# Patient Record
Sex: FEMALE | Race: WHITE | NOT HISPANIC OR LATINO | Employment: FULL TIME | URBAN - METROPOLITAN AREA
[De-identification: names, ages, dates, MRNs, and addresses within clinical notes are randomized per-mention and may not be internally consistent; named-entity substitution may affect disease eponyms.]

---

## 2023-12-07 ENCOUNTER — TELEPHONE (OUTPATIENT)
Dept: OBSTETRICS AND GYNECOLOGY | Facility: CLINIC | Age: 23
End: 2023-12-07

## 2023-12-07 ENCOUNTER — OFFICE VISIT (OUTPATIENT)
Dept: OBSTETRICS AND GYNECOLOGY | Facility: CLINIC | Age: 23
End: 2023-12-07
Payer: COMMERCIAL

## 2023-12-07 VITALS
HEIGHT: 65 IN | DIASTOLIC BLOOD PRESSURE: 86 MMHG | SYSTOLIC BLOOD PRESSURE: 138 MMHG | WEIGHT: 233.6 LBS | BODY MASS INDEX: 38.92 KG/M2

## 2023-12-07 DIAGNOSIS — Z30.46 ENCOUNTER FOR NEXPLANON REMOVAL: ICD-10-CM

## 2023-12-07 DIAGNOSIS — Z01.419 ROUTINE GYNECOLOGICAL EXAMINATION: Primary | ICD-10-CM

## 2023-12-07 LAB
B-HCG UR QL: NEGATIVE
BILIRUB BLD-MCNC: NEGATIVE MG/DL
CLARITY, POC: CLEAR
COLOR UR: YELLOW
EXPIRATION DATE: NORMAL
GLUCOSE UR STRIP-MCNC: NEGATIVE MG/DL
INTERNAL NEGATIVE CONTROL: NORMAL
INTERNAL POSITIVE CONTROL: NORMAL
KETONES UR QL: NEGATIVE
LEUKOCYTE EST, POC: NEGATIVE
Lab: NORMAL
NITRITE UR-MCNC: NEGATIVE MG/ML
PH UR: 5 [PH] (ref 5–8)
PROT UR STRIP-MCNC: NEGATIVE MG/DL
RBC # UR STRIP: NEGATIVE /UL
SP GR UR: 1 (ref 1–1.03)
UROBILINOGEN UR QL: NORMAL

## 2023-12-07 RX ORDER — DESVENLAFAXINE SUCCINATE 50 MG/1
50 TABLET, EXTENDED RELEASE ORAL DAILY
COMMUNITY
Start: 2023-09-25 | End: 2024-09-24

## 2023-12-07 NOTE — PROGRESS NOTES
24 yo  here for Nexplanon removal. Menarche 15yo regular cycles, up to eight days. Sexually active, feels loved. No hx of abuse. No family hx of Colon, Breast, uterine or ovarian cancer.      PMH: No past medical history on file.             PSH:     Past Surgical History:   Procedure Laterality Date    WISDOM TOOTH EXTRACTION              POB hx: G0  PGYN hx:  STD Denies ( GC/CT today)   Pap Next appt; First screening   Contraception; Barrier; Desires ParaGard    Menarche 15yo   Menopause N/A       Social hx:   Social History     Socioeconomic History    Marital status: Single   Tobacco Use    Smoking status: Never    Smokeless tobacco: Never   Vaping Use    Vaping Use: Never used   Substance and Sexual Activity    Alcohol use: Never    Drug use: Never    Sexual activity: Defer        [  X ] no h/o abuse/DV:                 Allergies:       Allergies   Allergen Reactions    Azithromycin Rash    Cefdinir Rash                  Meds:   Current Outpatient Medications:     desvenlafaxine (PRISTIQ) 50 MG 24 hr tablet, Take 1 tablet by mouth Daily., Disp: , Rfl:     escitalopram (LEXAPRO) 10 MG tablet, Take 1 tablet by mouth Daily., Disp: 30 tablet, Rfl: 5    ondansetron ODT (ZOFRAN-ODT) 4 MG disintegrating tablet, Take 1 sublingually every 4-6 hours as needed for nausea vomiting., Disp: 20 tablet, Rfl: 0    vitamin D (ERGOCALCIFEROL) 1.25 MG (02106 UT) capsule capsule, Take 1 capsule by mouth Every 7 (Seven) Days.  , Disp: , Rfl:      Review of Systems   Spotting at times     Vitals:    23 0948   BP: 138/86        OBGyn Exam     Vitals: VSS; AF    General Appearance:  Awake. Alert. Well developed. Well nourished. In no acute distress.    Visual Inspection: ° Abdomen was normal on visual inspection.  Palpation: ° Abdomen was soft. ° Abdominal non-tender.    ° Oriented to time, place, and person.  Skin:  ° General appearance was normal. No bruising or ecchymosis.      PROCEDURE NOTE:  Patient was counseled on  risks of Nexplanon removal, including pain, infection, bleeding, discomfort, bruising, and reaction to local anesthesia. Written consent was obtained.  A time out was performed using two patient identifiers.  The patient was placed in the supine position with her left upper extremity supinated and abducted away from the body to expose the Nexplanon device. The device was easily palpated underneath the skin in the biceps groove.  The area was cleansed with alcohol and approximately 5mL of 1% lidocaine with epinephrine were injected at the prior insertion site for anesthesia. The area was then prepped with betadine. A 0.25cm incision was made over the previous insertion scar and the Nexplanon device was tented up through the incision, grasped with a  hemostat clamp and easily removed from the arm.  The device was inspected and noted to be intact.  The incision was closed with a steri strip and a pressure dressing was applied. The patient tolerated the procedure well.     Diagnoses and all orders for this visit:    1. Routine gynecological examination (Primary)  -     POC Urinalysis Dipstick  -     POC Pregnancy, Urine    2. Encounter for Nexplanon removal     Nexplanon removed   GC/CT sent  Pap with IUD placement; come on your cycle.   Barrier method to be used     Christopher Wolf, DO  12/07/2023    10:22 EST

## 2023-12-09 LAB
C TRACH RRNA SPEC QL NAA+PROBE: POSITIVE
N GONORRHOEA RRNA SPEC QL NAA+PROBE: NEGATIVE
T VAGINALIS RRNA SPEC QL NAA+PROBE: NEGATIVE

## 2023-12-11 RX ORDER — DOXYCYCLINE HYCLATE 100 MG
100 TABLET ORAL 2 TIMES DAILY
Qty: 14 TABLET | Refills: 0 | Status: SHIPPED | OUTPATIENT
Start: 2023-12-11 | End: 2023-12-18

## 2023-12-11 NOTE — PROGRESS NOTES
Chlamydia infection. Will order Antibiotics. You and your partner must take ( He can go to his Doctor or the health Department) No intercourse for 14 days after finishing Abx. Test of cure 1 month with me

## 2024-05-02 ENCOUNTER — TELEPHONE (OUTPATIENT)
Dept: URGENT CARE | Facility: CLINIC | Age: 24
End: 2024-05-02
Payer: COMMERCIAL

## 2024-05-02 DIAGNOSIS — N76.0 BACTERIAL VAGINOSIS: Primary | ICD-10-CM

## 2024-05-02 DIAGNOSIS — B96.89 BACTERIAL VAGINOSIS: Primary | ICD-10-CM

## 2024-05-02 RX ORDER — METRONIDAZOLE 500 MG/1
500 TABLET ORAL 2 TIMES DAILY
Qty: 14 TABLET | Refills: 0 | Status: SHIPPED | OUTPATIENT
Start: 2024-05-02 | End: 2024-05-09

## 2024-05-02 NOTE — TELEPHONE ENCOUNTER
Called, patient answered and confirmed her date of birth, relayed test results as detailed below:     NuSwab VG+ - Swab, Vagina  Order: [id number]  Status: Final result       Visible to patient: Yes (seen)       Dx: Dysuria; Unprotected sex; Foul smelli...    Specimen Information: Vagina; Swab   0 Result Notes       1 HM Topic      Component  Ref Range & Units    Atopobium Vaginae  Score High - 2 Abnormal    BVAB 2  Score High - 2 Abnormal    Megasphaera 1  Score High - 2 Abnormal    Comment: Calculate total score by adding the 3 individual bacterial  vaginosis (BV) marker scores together.  Total score is  interpreted as follows:  Total score 0-1: Indicates the absence of BV.  Total score   2: Indeterminate for BV. Additional clinical                   data should be evaluated to establish a                   diagnosis.  Total score 3-6: Indicates the presence of BV.  This test was developed and its performance characteristics  determined by [facility].  It has not been cleared or approved  by the Food and Drug Administration.   Candida Albicans, TJ  Negative Negative   Candida Glabrata, TJ  Negative Negative   Trichomonas vaginosis  Negative Negative   Chlamydia trachomatis, TJ  Negative Negative   Neisseria gonorrhoeae, TJ  Negative Negative   Resulting Agency [facility]              Narrative  Performed by: [facility]  Test(s) 180056-Candida albicans, TJ; 180057-Candida glabrata, TJ  was developed and its performance characteristics determined  by [facility]. It has not been cleared or approved by the Food  and Drug Administration.  Performed at:  [facility]  [id number]  : Kylie Tracy MD, Phone:  [phone]      Specimen Collected: 04/30/24 09:33 EDT Last Resulted: 05/02/24 03:35 EDT           Recommended Metronidazole 500 mg BID x7 days, no alcohol while taking medication. Patient reports no further questions at this time, recommended she call us back if she  has any, patient expressed understanding and agreement to all of the above.     -John Cooksey, PA-C

## 2024-10-10 ENCOUNTER — OFFICE VISIT (OUTPATIENT)
Dept: OBSTETRICS AND GYNECOLOGY | Facility: CLINIC | Age: 24
End: 2024-10-10
Payer: COMMERCIAL

## 2024-10-10 VITALS
WEIGHT: 218.6 LBS | DIASTOLIC BLOOD PRESSURE: 78 MMHG | HEIGHT: 65 IN | SYSTOLIC BLOOD PRESSURE: 118 MMHG | BODY MASS INDEX: 36.42 KG/M2

## 2024-10-10 DIAGNOSIS — Z01.419 CERVICAL SMEAR, AS PART OF ROUTINE GYNECOLOGICAL EXAMINATION: ICD-10-CM

## 2024-10-10 DIAGNOSIS — Z01.419 ROUTINE GYNECOLOGICAL EXAMINATION: Primary | ICD-10-CM

## 2024-10-10 LAB

## 2024-10-10 RX ORDER — ALBUTEROL SULFATE 90 UG/1
2 INHALANT RESPIRATORY (INHALATION)
COMMUNITY
Start: 2024-09-10 | End: 2024-10-10

## 2024-10-10 RX ORDER — HYDROXYZINE HYDROCHLORIDE 25 MG/1
25 TABLET, FILM COATED ORAL
COMMUNITY
Start: 2024-09-10

## 2024-10-10 RX ORDER — DESVENLAFAXINE 50 MG/1
50 TABLET, FILM COATED, EXTENDED RELEASE ORAL DAILY
COMMUNITY
Start: 2024-09-10 | End: 2025-09-10

## 2024-10-10 RX ORDER — LEVOTHYROXINE SODIUM 50 UG/1
50 TABLET ORAL DAILY
COMMUNITY
Start: 2024-09-13 | End: 2025-09-13

## 2024-10-10 NOTE — PROGRESS NOTES
"GYN Annual Exam     CC- Here for annual exam.     Hayley Morgan is a 24 y.o. female new patient who presents for annual well woman exam. Periods are regular every 28-30 days, lasting 6 days.     OB History    No obstetric history on file.         Menarche: 16 yo   Current contraception: condoms  History of abnormal Pap smear: no  History of abnormal mammogram: no  Family history of uterine, colon or ovarian cancer: no  Family history of breast cancer: no  H/o STDs: CT 2024   Last pap:Today is her first   Gardasil:completed  MAYRA: none    Health Maintenance   Topic Date Due    Annual Gynecologic Pelvic and Breast Exam  Never done    BMI FOLLOWUP  Never done    HPV VACCINES (1 - 3-dose series) Never done    TDAP/TD VACCINES (2 - Td or Tdap) 08/03/2022    ANNUAL PHYSICAL  Never done    PAP SMEAR  Never done    COVID-19 Vaccine (3 - 2023-24 season) 09/01/2024    HEPATITIS C SCREENING  Completed    INFLUENZA VACCINE  Completed    MENINGOCOCCAL VACCINE  Completed    Pneumococcal Vaccine 0-64  Aged Out    CHLAMYDIA SCREENING  Discontinued       No past medical history on file.    Past Surgical History:   Procedure Laterality Date    WISDOM TOOTH EXTRACTION           Current Outpatient Medications:     desvenlafaxine (PRISTIQ) 50 MG 24 hr tablet, Take 1 tablet by mouth Daily., Disp: , Rfl:     hydrOXYzine (ATARAX) 25 MG tablet, Take 1 tablet by mouth., Disp: , Rfl:     levothyroxine (SYNTHROID, LEVOTHROID) 50 MCG tablet, Take 1 tablet by mouth Daily., Disp: , Rfl:     Allergies   Allergen Reactions    Azithromycin Rash    Cefdinir Rash       Social History     Tobacco Use    Smoking status: Never    Smokeless tobacco: Never   Vaping Use    Vaping status: Never Used   Substance Use Topics    Alcohol use: Never    Drug use: Never       Family History   Problem Relation Age of Onset    No Known Problems Mother     No Known Problems Father        Review of Systems  Neg     /78   Ht 165.1 cm (65\")   Wt 99.2 kg (218 lb 9.6 " oz)   LMP 10/01/2024 (Approximate)   BMI 36.38 kg/m²     Physical Exam  Exam conducted with a chaperone present.   Constitutional:       Appearance: Normal appearance. She is obese.   Pulmonary:      Effort: Pulmonary effort is normal.      Breath sounds: Normal breath sounds.   Abdominal:      General: Abdomen is flat.      Palpations: Abdomen is soft.   Genitourinary:     General: Normal vulva.      Exam position: Lithotomy position.      Vagina: Normal.      Cervix: Normal.      Uterus: Normal.       Adnexa: Right adnexa normal and left adnexa normal.   Neurological:      General: No focal deficit present.      Mental Status: She is alert and oriented to person, place, and time.   Psychiatric:         Mood and Affect: Mood normal.         Behavior: Behavior normal.              Assessment/Plan    1) GYN HM: pap/G/C/T  SBE demonstrated and encouraged.  2) STD screening: declines Condoms encouraged.  3) Contraception: condoms Nexplanon ordered today   4) Family Planning: family planning: no plans at present, encourage folic acid daily  5) Diet and Exercise discussed  6) Smoking Status: No  7) Social:   8) MMG- plan age 42  9)Follow up 2 weeks nexplanon        Diagnoses and all orders for this visit:    1. Routine gynecological examination (Primary)  -     POC Urinalysis Dipstick  -     POC Pregnancy, Urine  -     Chlamydia trachomatis, Neisseria gonorrhoeae, Trichomonas vaginalis, PCR - Urine, Urine, Random Void  -     IGP,CtNgTv,rfx Aptima HPV ASCU    2. Cervical smear, as part of routine gynecological examination  -     IGP,CtNgTv,rfx Aptima HPV ASCU          Christopher Wolf DO  10/10/2024    14:49 EDT

## 2024-10-13 LAB
C TRACH RRNA SPEC QL NAA+PROBE: NEGATIVE
N GONORRHOEA RRNA SPEC QL NAA+PROBE: NEGATIVE
T VAGINALIS RRNA SPEC QL NAA+PROBE: NEGATIVE

## 2024-10-15 ENCOUNTER — TELEPHONE (OUTPATIENT)
Dept: OBSTETRICS AND GYNECOLOGY | Facility: CLINIC | Age: 24
End: 2024-10-15
Payer: COMMERCIAL

## 2024-10-17 LAB
C TRACH RRNA CVX QL NAA+PROBE: NEGATIVE
CONV .: NORMAL
CYTOLOGIST CVX/VAG CYTO: NORMAL
CYTOLOGY CVX/VAG DOC CYTO: NORMAL
CYTOLOGY CVX/VAG DOC THIN PREP: NORMAL
DX ICD CODE: NORMAL
Lab: NORMAL
Lab: NORMAL
N GONORRHOEA RRNA CVX QL NAA+PROBE: NEGATIVE
OTHER STN SPEC: NORMAL
STAT OF ADQ CVX/VAG CYTO-IMP: NORMAL
T VAGINALIS RRNA SPEC QL NAA+PROBE: NEGATIVE

## 2024-10-21 ENCOUNTER — TELEPHONE (OUTPATIENT)
Dept: OBSTETRICS AND GYNECOLOGY | Facility: CLINIC | Age: 24
End: 2024-10-21

## 2024-10-21 NOTE — TELEPHONE ENCOUNTER
Nexplanon covered as buy and bill, device is available. I called the patient and left a voice mail to call and schedule.

## 2024-10-21 NOTE — TELEPHONE ENCOUNTER
Hub staff attempted to follow warm transfer process and was unsuccessful     Caller: Hayley Morgan    Relationship to patient: Self    Best call back number: [phone] (home)       Patient is needing: PT CALLING TO SCHEDULE NEXPLANON INSERTION. OFFICE CLOSED.

## 2024-11-05 ENCOUNTER — PROCEDURE VISIT (OUTPATIENT)
Dept: OBSTETRICS AND GYNECOLOGY | Facility: CLINIC | Age: 24
End: 2024-11-05
Payer: COMMERCIAL

## 2024-11-05 VITALS
HEIGHT: 65 IN | DIASTOLIC BLOOD PRESSURE: 90 MMHG | SYSTOLIC BLOOD PRESSURE: 124 MMHG | WEIGHT: 211 LBS | BODY MASS INDEX: 35.16 KG/M2

## 2024-11-05 DIAGNOSIS — Z30.017 NEXPLANON INSERTION: ICD-10-CM

## 2024-11-05 DIAGNOSIS — Z13.89 SCREENING FOR GENITOURINARY CONDITION: Primary | ICD-10-CM

## 2024-11-05 NOTE — PROGRESS NOTES
Last exam: here today for Nexpolanon placement   LMP last week  Not sexually active > 2 weeks   HcG Neg     Hayley Morgan is a 24 y.o. female new patient who presents for annual well woman exam. Periods are regular every 28-30 days, lasting 6 days.     OB History    No obstetric history on file.         Menarche: 14 yo   Current contraception: condoms  History of abnormal Pap smear: no  History of abnormal mammogram: no  Family history of uterine, colon or ovarian cancer: no  Family history of breast cancer: Yes GM sister   H/o STDs: CT 2024   Last pap:Today is her first   Gardasil:completed  MAYRA: none    Health Maintenance   Topic Date Due    Annual Gynecologic Pelvic and Breast Exam  Never done    BMI FOLLOWUP  Never done    HPV VACCINES (1 - 3-dose series) Never done    TDAP/TD VACCINES (2 - Td or Tdap) 08/03/2022    ANNUAL PHYSICAL  Never done    COVID-19 Vaccine (3 - 2024-25 season) 09/01/2024    PAP SMEAR  10/10/2027    HEPATITIS C SCREENING  Completed    INFLUENZA VACCINE  Completed    MENINGOCOCCAL VACCINE  Completed    Pneumococcal Vaccine 0-64  Aged Out    CHLAMYDIA SCREENING  Discontinued       No past medical history on file.    Past Surgical History:   Procedure Laterality Date    WISDOM TOOTH EXTRACTION           Current Outpatient Medications:     desvenlafaxine (PRISTIQ) 50 MG 24 hr tablet, Take 1 tablet by mouth Daily., Disp: , Rfl:     Etonogestrel (Nexplanon) 68 MG implant subdermal implant, To be inserted one time by prescriber. Route Subdermal., Disp: 1 each, Rfl: 0    hydrOXYzine (ATARAX) 25 MG tablet, Take 1 tablet by mouth., Disp: , Rfl:     levothyroxine (SYNTHROID, LEVOTHROID) 50 MCG tablet, Take 1 tablet by mouth Daily., Disp: , Rfl:     Current Facility-Administered Medications:     Etonogestrel (NEXPLANON) 68 MG subdermal implant, , Intradermal, Once, Christopher Wolf, DO    Allergies   Allergen Reactions    Azithromycin Rash    Cefdinir Rash       Social History     Tobacco Use     "Smoking status: Never    Smokeless tobacco: Never   Vaping Use    Vaping status: Never Used   Substance Use Topics    Alcohol use: Never    Drug use: Never       Family History   Problem Relation Age of Onset    No Known Problems Mother     No Known Problems Father        Review of Systems  Neg     /90   Ht 165.1 cm (65\")   Wt 95.7 kg (211 lb)   LMP 10/01/2024 (Approximate)   BMI 35.11 kg/m²     Physical Exam  Exam conducted with a chaperone present.   Constitutional:       Appearance: Normal appearance. She is obese.   Pulmonary:      Effort: Pulmonary effort is normal.      Breath sounds: Normal breath sounds.   Abdominal:      General: Abdomen is flat.      Palpations: Abdomen is soft.   Genitourinary:     General: Normal vulva.      Exam position: Lithotomy position.      Vagina: Normal.      Cervix: Normal.      Uterus: Normal.       Adnexa: Right adnexa normal and left adnexa normal.   Neurological:      General: No focal deficit present.      Mental Status: She is alert and oriented to person, place, and time.   Psychiatric:         Mood and Affect: Mood normal.         Behavior: Behavior normal.       Nexplanon Insertion:    Procedures      Pre Dx: 1) Nexplanon Insertion   Post Dx: 1) Nexplanon Insertion  Urine pregnancy test was negative.  Patient is not on her cycle.   Pt currently uses:abstinence and condoms    Risks, benefits, alternatives explained. All questions answered. Consents signed.  The area for insertion prepped with betadine in a sterile fashion. About 3 mL of 1% lidocaine.     The insertion site was identified approximately 6-8 cm proximal to the elbow crease in the underside of the left upper  arm overlying the groove between the biceps and triceps muscles. The skin at the insertion site was stretched by my thumb and index finger. Then inserted the needle tip, bevel side up, at an angle not greater than 20° to the skin surface, just until the skin was penetrated. The applicator was " then lowered so that it was parallel to the arm. To minimize the chance of deep incision, the skin was tented upwards with the tip of the needle. The needle was then gently inserted to the full length. Then fixed the device in place on the arm with one hand. I then slowly and carefully retracted the needle cannula back along the length of the device after pushing the release button. The obturator was seen in the device to determine that the nexplanon had been released.     Both the patient and I were easily able to feel the device under the skin. Steri-Strips were applied at the site, and the arm was gently wrapped with gauze. Pt instructed to keep bandage on for 12-24 hrs.    There were no complications.   The patient tolerated the procedure well.     She was given a reminder card. She was advised to use condoms as a backup method for at least a week after insertion.    Expectations, warning signs and limitations reviewed.     Christopher Wolf DO    11/5/2024  13:54 EST              Assessment/Plan           Diagnoses and all orders for this visit:    1. Screening for genitourinary condition (Primary)  -     POC Urinalysis Dipstick  -     POC Pregnancy, Urine    2. Nexplanon insertion  -     Etonogestrel (NEXPLANON) 68 MG subdermal implant      Nexplanon placed   F/u 1 year annual     Christopher Wolf DO  [id number]     13:53 EST

## 2025-06-02 ENCOUNTER — OFFICE VISIT (OUTPATIENT)
Dept: OBSTETRICS AND GYNECOLOGY | Facility: CLINIC | Age: 25
End: 2025-06-02
Payer: COMMERCIAL

## 2025-06-02 VITALS
SYSTOLIC BLOOD PRESSURE: 122 MMHG | DIASTOLIC BLOOD PRESSURE: 70 MMHG | BODY MASS INDEX: 32.32 KG/M2 | WEIGHT: 194 LBS | HEIGHT: 65 IN

## 2025-06-02 DIAGNOSIS — Z11.3 SCREEN FOR STD (SEXUALLY TRANSMITTED DISEASE): Primary | ICD-10-CM

## 2025-06-02 DIAGNOSIS — Z30.46 ENCOUNTER FOR SURVEILLANCE OF IMPLANTABLE SUBDERMAL CONTRACEPTIVE: ICD-10-CM

## 2025-06-02 DIAGNOSIS — Z13.89 SCREENING FOR GENITOURINARY CONDITION: ICD-10-CM

## 2025-06-02 PROBLEM — Z01.419 ROUTINE GYNECOLOGICAL EXAMINATION: Status: RESOLVED | Noted: 2023-12-07 | Resolved: 2025-06-02

## 2025-06-02 NOTE — PROGRESS NOTES
25yo with nexplanon presents today for AUB. Has attempted CHC with and not assisting. Desires removal. Is going to [place] for vacation and wants to remove when returns.      PMH: No past medical history on file.             PSH:     Past Surgical History:   Procedure Laterality Date    WISDOM TOOTH EXTRACTION              Menarche: 16 yo   Current contraception: Nexplanon Nov 2024   History of abnormal Pap smear: no  History of abnormal mammogram: no  Family history of uterine, colon or ovarian cancer: no  Family history of breast cancer: Yes GM sister   H/o STDs: CT 2024   Last pap: Oct 2024 NILM STD screen Neg   Gardasil:completed  MAYRA: none      Social hx:   Social History     Socioeconomic History    Marital status: Single   Tobacco Use    Smoking status: Never    Smokeless tobacco: Never   Vaping Use    Vaping status: Never Used   Substance and Sexual Activity    Alcohol use: Never    Drug use: Never    Sexual activity: Defer        [  X ] no h/o abuse/DV:                 Allergies:       Allergies   Allergen Reactions    Azithromycin Rash    Cefdinir Rash                  Meds:   Current Outpatient Medications:     Etonogestrel (Nexplanon) 68 MG implant subdermal implant, To be inserted one time by prescriber. Route Subdermal., Disp: 1 each, Rfl: 0    Junel FE 1/20 1-20 MG-MCG per tablet, Take 1 tablet by mouth Daily., Disp: , Rfl:     levothyroxine (SYNTHROID, LEVOTHROID) 50 MCG tablet, Take 1 tablet by mouth Daily., Disp: , Rfl:     cetirizine (zyrTEC) 10 MG tablet, Take 1 tablet by mouth Daily for 30 days., Disp: 30 tablet, Rfl: 0    Current Facility-Administered Medications:     Etonogestrel (NEXPLANON) 68 MG subdermal implant, , Intradermal, Once, Christopher Wolf, DO     Review of Systems   AUB     Vitals:    06/02/25 0936   BP: 122/70        Physical Exam  Genitourinary:      Right Labia: No rash, tenderness or lesions.     Left Labia: No tenderness, lesions or rash.  Musculoskeletal:        Arms:        Comments: Nexplanon palpated    Neurological:      General: No focal deficit present.      Mental Status: She is alert and oriented to person, place, and time.   Skin:     General: Skin is warm and dry.   Psychiatric:         Mood and Affect: Mood normal.         Behavior: Behavior normal.          Diagnoses and all orders for this visit:    1. Screen for STD (sexually transmitted disease) (Primary)  -     Chlamydia trachomatis, Neisseria gonorrhoeae, Trichomonas vaginalis, PCR - Swab, Cervix    2. Screening for genitourinary condition  -     Cancel: POC Urinalysis Dipstick  -     Cancel: POC Pregnancy, Urine    3. Encounter for surveillance of implantable subdermal contraceptive    Nexplanon removal in 2 weeks   NSAID to assist with AUB   GC/CT swab ( unable to urinate)        I confirm that all copied/forwarded documentation in this record has been carefully reviewed, updated as necessary, and accurately reflects the patient's current status and plan of care.   Christopher Wolf DO  06/02/2025    10:11 EDT

## 2025-06-16 ENCOUNTER — PREP FOR SURGERY (OUTPATIENT)
Dept: OTHER | Facility: HOSPITAL | Age: 25
End: 2025-06-16
Payer: COMMERCIAL

## 2025-06-16 ENCOUNTER — OFFICE VISIT (OUTPATIENT)
Dept: OBSTETRICS AND GYNECOLOGY | Facility: CLINIC | Age: 25
End: 2025-06-16
Payer: COMMERCIAL

## 2025-06-16 VITALS
HEIGHT: 65 IN | DIASTOLIC BLOOD PRESSURE: 84 MMHG | WEIGHT: 194 LBS | BODY MASS INDEX: 32.32 KG/M2 | SYSTOLIC BLOOD PRESSURE: 128 MMHG

## 2025-06-16 DIAGNOSIS — Z30.430 ENCOUNTER FOR IUD INSERTION: ICD-10-CM

## 2025-06-16 DIAGNOSIS — Z30.46 ENCOUNTER FOR NEXPLANON REMOVAL: Primary | ICD-10-CM

## 2025-06-16 DIAGNOSIS — Z13.89 SCREENING FOR GENITOURINARY CONDITION: Primary | ICD-10-CM

## 2025-06-16 RX ORDER — SODIUM CHLORIDE 0.9 % (FLUSH) 0.9 %
10 SYRINGE (ML) INJECTION EVERY 12 HOURS SCHEDULED
OUTPATIENT
Start: 2025-06-16

## 2025-06-16 RX ORDER — SODIUM CHLORIDE 0.9 % (FLUSH) 0.9 %
10 SYRINGE (ML) INJECTION AS NEEDED
OUTPATIENT
Start: 2025-06-16

## 2025-06-16 RX ORDER — SODIUM CHLORIDE 9 MG/ML
40 INJECTION, SOLUTION INTRAVENOUS AS NEEDED
OUTPATIENT
Start: 2025-06-16

## 2025-06-16 NOTE — PROGRESS NOTES
After discussion desires nexplanon removal and IUD placement in the OR. [facility] has assisted with AUB on her nexplanon. Just got back from [place] and had a great time.     Last note : 25yo with nexplanon presents today for AUB. Has attempted CHC with and not assisting. Desires removal. Is going to [place] for vacation and wants to remove when returns.      PMH: No past medical history on file.             PSH:     Past Surgical History:   Procedure Laterality Date    WISDOM TOOTH EXTRACTION              Menarche: 16 yo   Current contraception: Nexplanon Nov 2024   History of abnormal Pap smear: no  History of abnormal mammogram: no  Family history of uterine, colon or ovarian cancer: no  Family history of breast cancer: Yes GM sister   H/o STDs: CT 2024   Last pap: Oct 2024 NILM STD screen Neg   Gardasil:completed  MAYRA: none      Social hx:   Social History     Socioeconomic History    Marital status: Single   Tobacco Use    Smoking status: Never    Smokeless tobacco: Never   Vaping Use    Vaping status: Never Used   Substance and Sexual Activity    Alcohol use: Never    Drug use: Never    Sexual activity: Defer        [  X ] no h/o abuse/DV:                 Allergies:       Allergies   Allergen Reactions    Azithromycin Rash    Cefdinir Rash                  Meds:   Current Outpatient Medications:     Junel FE 1/20 1-20 MG-MCG per tablet, Take 1 tablet by mouth Daily., Disp: , Rfl:     levothyroxine (SYNTHROID, LEVOTHROID) 50 MCG tablet, Take 1 tablet by mouth Daily., Disp: , Rfl:   No current facility-administered medications for this visit.     Review of Systems   AUB     Vitals:    06/16/25 0951   BP: 128/84        Physical Exam  Musculoskeletal:        Arms:       Comments: Nexplanon palpated    Neurological:      General: No focal deficit present.      Mental Status: She is alert and oriented to person, place, and time.   Skin:     General: Skin is warm and dry.   Psychiatric:         Mood and Affect: Mood  normal.         Behavior: Behavior normal.          Diagnoses and all orders for this visit:    1. Screening for genitourinary condition (Primary)  -     Cancel: POC Urinalysis Dipstick  -     Cancel: POC Pregnancy, Urine    Nexplanon removal in OR   IUD palcement in OR   R&B discussed today   NSAID to assist with AUB   GC/CT swab ( unable to urinate)      I confirm that all copied/forwarded documentation in this record has been carefully reviewed, updated as necessary, and accurately reflects the patient's current status and plan of care.     Christopher Wolf DO  [id number]     10:52 EDT

## 2025-06-26 ENCOUNTER — TELEPHONE (OUTPATIENT)
Dept: OBSTETRICS AND GYNECOLOGY | Facility: CLINIC | Age: 25
End: 2025-06-26
Payer: COMMERCIAL

## 2025-07-08 ENCOUNTER — OFFICE VISIT (OUTPATIENT)
Dept: OBSTETRICS AND GYNECOLOGY | Facility: CLINIC | Age: 25
End: 2025-07-08
Payer: COMMERCIAL

## 2025-07-08 VITALS — HEIGHT: 65 IN | WEIGHT: 192 LBS | BODY MASS INDEX: 31.99 KG/M2

## 2025-07-08 DIAGNOSIS — Z30.46 ENCOUNTER FOR NEXPLANON REMOVAL: ICD-10-CM

## 2025-07-08 DIAGNOSIS — Z13.89 SCREENING FOR GENITOURINARY CONDITION: Primary | ICD-10-CM

## 2025-07-08 DIAGNOSIS — Z30.40 ENCOUNTER FOR SURVEILLANCE OF CONTRACEPTIVES, UNSPECIFIED CONTRACEPTIVE: ICD-10-CM

## 2025-07-08 PROBLEM — Z30.430 ENCOUNTER FOR IUD INSERTION: Status: RESOLVED | Noted: 2025-06-16 | Resolved: 2025-07-08

## 2025-07-08 LAB
B-HCG UR QL: NEGATIVE
BILIRUB BLD-MCNC: NEGATIVE MG/DL
CLARITY, POC: CLEAR
COLOR UR: YELLOW
EXPIRATION DATE: NORMAL
GLUCOSE UR STRIP-MCNC: NEGATIVE MG/DL
INTERNAL NEGATIVE CONTROL: NORMAL
INTERNAL POSITIVE CONTROL: NORMAL
KETONES UR QL: NEGATIVE
LEUKOCYTE EST, POC: NEGATIVE
Lab: NORMAL
NITRITE UR-MCNC: NEGATIVE MG/ML
PH UR: 5 [PH] (ref 5–8)
PROT UR STRIP-MCNC: NEGATIVE MG/DL
RBC # UR STRIP: ABNORMAL /UL
SP GR UR: 1 (ref 1–1.03)
UROBILINOGEN UR QL: NORMAL

## 2025-07-08 RX ORDER — NORETHINDRONE ACETATE AND ETHINYL ESTRADIOL AND FERROUS FUMARATE 1MG-20(21)
1 KIT ORAL DAILY
Qty: 28 TABLET | Refills: 11 | Status: SHIPPED | OUTPATIENT
Start: 2025-07-08

## 2025-07-08 RX ORDER — IBUPROFEN 800 MG/1
800 TABLET, FILM COATED ORAL EVERY 8 HOURS PRN
Qty: 30 TABLET | Refills: 0 | Status: SHIPPED | OUTPATIENT
Start: 2025-07-08 | End: 2025-07-08

## 2025-07-08 RX ORDER — IBUPROFEN 800 MG/1
800 TABLET, FILM COATED ORAL EVERY 8 HOURS PRN
Qty: 30 TABLET | Refills: 0 | Status: SHIPPED | OUTPATIENT
Start: 2025-07-08

## 2025-07-08 RX ORDER — SERTRALINE HYDROCHLORIDE 25 MG/1
25 TABLET, FILM COATED ORAL DAILY
COMMUNITY
Start: 2025-06-16 | End: 2025-07-16

## 2025-07-08 NOTE — PROGRESS NOTES
25yo G0 here for nexplanon removal. Desires [facility] for contraception. HPV complete. Started Zoloft and feels better       PMH: No past medical history on file.             PSH:     Past Surgical History:   Procedure Laterality Date    WISDOM TOOTH EXTRACTION                    Social hx:   Social History     Socioeconomic History    Marital status: Single   Tobacco Use    Smoking status: Never    Smokeless tobacco: Never   Vaping Use    Vaping status: Never Used   Substance and Sexual Activity    Alcohol use: Never    Drug use: Never    Sexual activity: Defer        [  X ] no h/o abuse/DV:                   Allergies:       Allergies   Allergen Reactions    Azithromycin Rash    Cefdinir Rash                  Meds:   Current Outpatient Medications:     Junel FE 1/20 1-20 MG-MCG per tablet, Take 1 tablet by mouth Daily., Disp: 28 tablet, Rfl: 11    levothyroxine (SYNTHROID, LEVOTHROID) 50 MCG tablet, Take 1 tablet by mouth Daily., Disp: , Rfl:     sertraline (ZOLOFT) 25 MG tablet, Take 1 tablet by mouth Daily., Disp: , Rfl:     ibuprofen (ADVIL,MOTRIN) 800 MG tablet, Take 1 tablet by mouth Every 8 (Eight) Hours As Needed for Moderate Pain (pain)., Disp: 30 tablet, Rfl: 0     Review of Systems   Neg     There were no vitals filed for this visit.     Physical Exam  Constitutional:       Appearance: Normal appearance.   Cardiovascular:      Rate and Rhythm: Normal rate.   Pulmonary:      Effort: Pulmonary effort is normal.   Abdominal:      General: Abdomen is flat.      Palpations: Abdomen is soft.   Musculoskeletal:        Arms:       Comments: Nexplanon in correct position    Neurological:      General: No focal deficit present.      Mental Status: She is alert and oriented to person, place, and time.   Psychiatric:         Mood and Affect: Mood normal.         Behavior: Behavior normal.      PROCEDURE NOTE:  Patient was counseled on risks of Nexplanon removal, including pain, infection, bleeding, discomfort, bruising,  and reaction to local anesthesia. Written consent was obtained.  A time out was performed using two patient identifiers.  The patient was placed in the supine position with her left upper extremity supinated and abducted away from the body to expose the Nexplanon device. The device was easily palpated underneath the skin in the biceps groove.  The area was cleansed with alcohol and approximately 5mL of 1% lidocaine with epinephrine were injected at the prior insertion site for anesthesia. The area was then prepped with betadine. A 0.25cm incision was made over the previous insertion scar and the Nexplanon device was tented up through the incision, grasped with a  hemostat clamp and easily removed from the arm.  The device was inspected and noted to be intact.  The incision was closed with a steri strip and a pressure dressing was applied. The patient tolerated the procedure well.     Diagnoses and all orders for this visit:    1. Screening for genitourinary condition (Primary)  -     POC Urinalysis Dipstick  -     POC Pregnancy, Urine    2. Encounter for Nexplanon removal    3. Encounter for surveillance of contraceptives, unspecified contraceptive    Other orders  -     Junel FE 1/20 1-20 MG-MCG per tablet; Take 1 tablet by mouth Daily.  Dispense: 28 tablet; Refill: 11  -     Discontinue: ibuprofen (ADVIL,MOTRIN) 800 MG tablet; Take 1 tablet by mouth Every 8 (Eight) Hours As Needed for Moderate Pain (pain).  Dispense: 30 tablet; Refill: 0  -     ibuprofen (ADVIL,MOTRIN) 800 MG tablet; Take 1 tablet by mouth Every 8 (Eight) Hours As Needed for Moderate Pain (pain).  Dispense: 30 tablet; Refill: 0         Christopher Wolf DO  07/08/2025    10:00 EDT